# Patient Record
Sex: FEMALE | Race: BLACK OR AFRICAN AMERICAN | NOT HISPANIC OR LATINO | Employment: UNEMPLOYED | URBAN - METROPOLITAN AREA
[De-identification: names, ages, dates, MRNs, and addresses within clinical notes are randomized per-mention and may not be internally consistent; named-entity substitution may affect disease eponyms.]

---

## 2018-01-15 ENCOUNTER — HOSPITAL ENCOUNTER (EMERGENCY)
Facility: HOSPITAL | Age: 23
Discharge: HOME | End: 2018-01-15
Payer: COMMERCIAL

## 2018-01-15 VITALS
SYSTOLIC BLOOD PRESSURE: 105 MMHG | HEART RATE: 83 BPM | BODY MASS INDEX: 25.48 KG/M2 | HEIGHT: 64 IN | RESPIRATION RATE: 14 BRPM | WEIGHT: 149.25 LBS | DIASTOLIC BLOOD PRESSURE: 58 MMHG | TEMPERATURE: 98 F | OXYGEN SATURATION: 100 %

## 2018-01-15 DIAGNOSIS — R42 VERTIGO: Primary | ICD-10-CM

## 2018-01-15 DIAGNOSIS — R51.9 HEADACHE: ICD-10-CM

## 2018-01-15 DIAGNOSIS — R42 DIZZINESS: ICD-10-CM

## 2018-01-15 LAB
ALBUMIN SERPL BCP-MCNC: 3.5 G/DL
ALP SERPL-CCNC: 71 U/L
ALT SERPL W/O P-5'-P-CCNC: 11 U/L
AMPHET+METHAMPHET UR QL: NEGATIVE
ANION GAP SERPL CALC-SCNC: 9 MMOL/L
AST SERPL-CCNC: 15 U/L
B-HCG UR QL: NEGATIVE
BACTERIA #/AREA URNS HPF: NORMAL /HPF
BARBITURATES UR QL SCN>200 NG/ML: NEGATIVE
BASOPHILS # BLD AUTO: 0.04 K/UL
BASOPHILS NFR BLD: 0.4 %
BENZODIAZ UR QL SCN>200 NG/ML: NEGATIVE
BILIRUB SERPL-MCNC: 0.4 MG/DL
BILIRUB UR QL STRIP: NEGATIVE
BUN SERPL-MCNC: 10 MG/DL
BZE UR QL SCN: NEGATIVE
CALCIUM SERPL-MCNC: 9.2 MG/DL
CANNABINOIDS UR QL SCN: NEGATIVE
CHLORIDE SERPL-SCNC: 107 MMOL/L
CLARITY UR: CLEAR
CO2 SERPL-SCNC: 22 MMOL/L
COLOR UR: YELLOW
CREAT SERPL-MCNC: 0.7 MG/DL
CREAT UR-MCNC: 59.7 MG/DL
DIFFERENTIAL METHOD: ABNORMAL
EOSINOPHIL # BLD AUTO: 0.2 K/UL
EOSINOPHIL NFR BLD: 2.4 %
ERYTHROCYTE [DISTWIDTH] IN BLOOD BY AUTOMATED COUNT: 15 %
EST. GFR  (AFRICAN AMERICAN): >60 ML/MIN/1.73 M^2
EST. GFR  (NON AFRICAN AMERICAN): >60 ML/MIN/1.73 M^2
GLUCOSE SERPL-MCNC: 84 MG/DL
GLUCOSE UR QL STRIP: NEGATIVE
HCT VFR BLD AUTO: 41.6 %
HGB BLD-MCNC: 14 G/DL
HGB UR QL STRIP: NEGATIVE
KETONES UR QL STRIP: NEGATIVE
LEUKOCYTE ESTERASE UR QL STRIP: ABNORMAL
LYMPHOCYTES # BLD AUTO: 3.2 K/UL
LYMPHOCYTES NFR BLD: 32 %
MCH RBC QN AUTO: 28.8 PG
MCHC RBC AUTO-ENTMCNC: 33.7 G/DL
MCV RBC AUTO: 86 FL
METHADONE UR QL SCN>300 NG/ML: NEGATIVE
MICROSCOPIC COMMENT: NORMAL
MONOCYTES # BLD AUTO: 0.8 K/UL
MONOCYTES NFR BLD: 7.6 %
NEUTROPHILS # BLD AUTO: 5.8 K/UL
NEUTROPHILS NFR BLD: 57.6 %
NITRITE UR QL STRIP: NEGATIVE
OPIATES UR QL SCN: NEGATIVE
PCP UR QL SCN>25 NG/ML: NEGATIVE
PH UR STRIP: 6 [PH] (ref 5–8)
PLATELET # BLD AUTO: 287 K/UL
PMV BLD AUTO: 9.7 FL
POTASSIUM SERPL-SCNC: 4.3 MMOL/L
PROT SERPL-MCNC: 7.1 G/DL
PROT UR QL STRIP: NEGATIVE
RBC # BLD AUTO: 4.86 M/UL
SODIUM SERPL-SCNC: 138 MMOL/L
SP GR UR STRIP: 1.01 (ref 1–1.03)
SQUAMOUS #/AREA URNS HPF: 10 /HPF
TOXICOLOGY INFORMATION: NORMAL
TSH SERPL DL<=0.005 MIU/L-ACNC: 0.99 UIU/ML
URN SPEC COLLECT METH UR: ABNORMAL
UROBILINOGEN UR STRIP-ACNC: NEGATIVE EU/DL
WBC # BLD AUTO: 10.12 K/UL
WBC #/AREA URNS HPF: 1 /HPF (ref 0–5)

## 2018-01-15 PROCEDURE — 81025 URINE PREGNANCY TEST: CPT

## 2018-01-15 PROCEDURE — 99284 EMERGENCY DEPT VISIT MOD MDM: CPT

## 2018-01-15 PROCEDURE — 25000003 PHARM REV CODE 250: Performed by: PHYSICIAN ASSISTANT

## 2018-01-15 PROCEDURE — 80307 DRUG TEST PRSMV CHEM ANLYZR: CPT

## 2018-01-15 PROCEDURE — 81000 URINALYSIS NONAUTO W/SCOPE: CPT

## 2018-01-15 PROCEDURE — 84443 ASSAY THYROID STIM HORMONE: CPT

## 2018-01-15 PROCEDURE — 80053 COMPREHEN METABOLIC PANEL: CPT

## 2018-01-15 PROCEDURE — 85025 COMPLETE CBC W/AUTO DIFF WBC: CPT

## 2018-01-15 PROCEDURE — 36415 COLL VENOUS BLD VENIPUNCTURE: CPT

## 2018-01-15 RX ORDER — NAPROXEN 500 MG/1
500 TABLET ORAL 2 TIMES DAILY WITH MEALS
Qty: 12 TABLET | Refills: 0 | Status: SHIPPED | OUTPATIENT
Start: 2018-01-15 | End: 2023-10-27

## 2018-01-15 RX ORDER — NAPROXEN 500 MG/1
500 TABLET ORAL
Status: COMPLETED | OUTPATIENT
Start: 2018-01-15 | End: 2018-01-15

## 2018-01-15 RX ORDER — MECLIZINE HYDROCHLORIDE 25 MG/1
50 TABLET ORAL
Status: COMPLETED | OUTPATIENT
Start: 2018-01-15 | End: 2018-01-15

## 2018-01-15 RX ORDER — MECLIZINE HYDROCHLORIDE 25 MG/1
25 TABLET ORAL 3 TIMES DAILY PRN
Qty: 20 TABLET | Refills: 0 | Status: SHIPPED | OUTPATIENT
Start: 2018-01-15 | End: 2023-10-27

## 2018-01-15 RX ADMIN — NAPROXEN 500 MG: 500 TABLET ORAL at 12:01

## 2018-01-15 RX ADMIN — MECLIZINE HYDROCHLORIDE 50 MG: 25 TABLET ORAL at 10:01

## 2018-01-15 NOTE — ED PROVIDER NOTES
"   History      Chief Complaint   Patient presents with    Dizziness     c/o dizziness today with some confusion       Review of patient's allergies indicates:  No Known Allergies     HPI   HPI    1/15/2018, 10:20 AM   History obtained from the patient       History of Present Illness: Daya Dejesus is a 22 y.o. female patient who presents to the Emergency Department for room spinning dizziness with headache, and she says her mind feels "slow".   She is living in a sober living house now.  She says she has not used crack or mojo in a month.  She denies fever, n/v.  Symptoms are moderate in severity.     No further complaints or concerns at this time.           PCP: Primary Doctor No       Past Medical History:  History reviewed. No pertinent past medical history.      Past Surgical History:  Past Surgical History:   Procedure Laterality Date    TONSILLECTOMY             Family History:  History reviewed. No pertinent family history.        Social History:  Social History     Social History Main Topics    Smoking status: Current Every Day Smoker    Smokeless tobacco: Not on file    Alcohol use No    Drug use: Unknown      Comment: Mojo and Crack    Sexual activity: Yes       ROS     Review of Systems   Constitutional: Negative for chills and fever.   HENT: Negative for sore throat.    Respiratory: Negative for shortness of breath.    Cardiovascular: Negative for chest pain.   Gastrointestinal: Negative for nausea.   Genitourinary: Negative for dysuria.   Musculoskeletal: Negative for back pain.   Skin: Negative for rash.   Neurological: Positive for dizziness and headaches. Negative for seizures, syncope, facial asymmetry, speech difficulty and weakness.   Hematological: Does not bruise/bleed easily.   All other systems reviewed and are negative.      Physical Exam      Initial Vitals [01/15/18 1005]   BP Pulse Resp Temp SpO2   108/81 94 18 98.1 °F (36.7 °C) 100 %      MAP       90         Physical " "Exam  Vital signs and nursing notes reviewed.  Constitutional: Patient is in NAD. Awake and alert. Well-developed and well-nourished.  Head: Atraumatic. Normocephalic.  Eyes: PERRL. EOM intact. Conjunctivae nl. No scleral icterus.  ENT: Mucous membranes are moist. Oropharynx is clear.  Neck: Supple. No JVD. No lymphadenopathy.  No meningismus  Cardiovascular: Regular rate and rhythm. No murmurs, rubs, or gallops. Distal pulses are 2+ and symmetric.  Pulmonary/Chest: No respiratory distress. Clear to auscultation bilaterally. No wheezing, rales, or rhonchi.  Abdominal: Soft. Non-distended. No TTP. No rebound, guarding, or rigidity. Good bowel sounds.  Genitourinary: No CVA tenderness  Musculoskeletal: Moves all extremities. No edema.   Skin: Warm and dry.  Neurological: Awake and alert. No acute focal neurological deficits are appreciated. No facial droop.  Tongue is midline.  No pronator drift.  Finger to nose normal.  Hand  equal and strong, 5/5 motor strength x 4.    Psychiatric: Normal affect. Good eye contact. Appropriate in content.      ED Course          Procedures  ED Vital Signs:  Vitals:    01/15/18 1005 01/15/18 1149 01/15/18 1201 01/15/18 1246   BP: 108/81 (!) 109/58 107/60 (!) 105/58   Pulse: 94 83 77 83   Resp: 18 19 17 14   Temp: 98.1 °F (36.7 °C)      TempSrc: Oral      SpO2: 100% 100% 97% 100%   Weight: 67.7 kg (149 lb 4 oz)      Height: 5' 3.5" (1.613 m)            Results for orders placed or performed during the hospital encounter of 01/15/18   CBC auto differential   Result Value Ref Range    WBC 10.12 3.90 - 12.70 K/uL    RBC 4.86 4.00 - 5.40 M/uL    Hemoglobin 14.0 12.0 - 16.0 g/dL    Hematocrit 41.6 37.0 - 48.5 %    MCV 86 82 - 98 fL    MCH 28.8 27.0 - 31.0 pg    MCHC 33.7 32.0 - 36.0 g/dL    RDW 15.0 (H) 11.5 - 14.5 %    Platelets 287 150 - 350 K/uL    MPV 9.7 9.2 - 12.9 fL    Gran # 5.8 1.8 - 7.7 K/uL    Lymph # 3.2 1.0 - 4.8 K/uL    Mono # 0.8 0.3 - 1.0 K/uL    Eos # 0.2 0.0 - 0.5 " K/uL    Baso # 0.04 0.00 - 0.20 K/uL    Gran% 57.6 38.0 - 73.0 %    Lymph% 32.0 18.0 - 48.0 %    Mono% 7.6 4.0 - 15.0 %    Eosinophil% 2.4 0.0 - 8.0 %    Basophil% 0.4 0.0 - 1.9 %    Differential Method Automated    Pregnancy, urine rapid   Result Value Ref Range    Preg Test, Ur Negative    Urinalysis   Result Value Ref Range    Specimen UA Urine, Clean Catch     Color, UA Yellow Yellow, Straw, Kesha    Appearance, UA Clear Clear    pH, UA 6.0 5.0 - 8.0    Specific Gravity, UA 1.015 1.005 - 1.030    Protein, UA Negative Negative    Glucose, UA Negative Negative    Ketones, UA Negative Negative    Bilirubin (UA) Negative Negative    Occult Blood UA Negative Negative    Nitrite, UA Negative Negative    Urobilinogen, UA Negative <2.0 EU/dL    Leukocytes, UA Trace (A) Negative   Drug screen panel, emergency   Result Value Ref Range    Benzodiazepines Negative     Methadone metabolites Negative     Cocaine (Metab.) Negative     Opiate Scrn, Ur Negative     Barbiturate Screen, Ur Negative     Amphetamine Screen, Ur Negative     THC Negative     Phencyclidine Negative     Creatinine, Random Ur 59.7 15.0 - 325.0 mg/dL    Toxicology Information SEE COMMENT    Urinalysis Microscopic   Result Value Ref Range    WBC, UA 1 0 - 5 /hpf    Bacteria, UA Occasional None-Occ /hpf    Squam Epithel, UA 10 /hpf    Microscopic Comment SEE COMMENT    Comprehensive metabolic panel   Result Value Ref Range    Sodium 138 136 - 145 mmol/L    Potassium 4.3 3.5 - 5.1 mmol/L    Chloride 107 95 - 110 mmol/L    CO2 22 (L) 23 - 29 mmol/L    Glucose 84 70 - 110 mg/dL    BUN, Bld 10 6 - 20 mg/dL    Creatinine 0.7 0.5 - 1.4 mg/dL    Calcium 9.2 8.7 - 10.5 mg/dL    Total Protein 7.1 6.0 - 8.4 g/dL    Albumin 3.5 3.5 - 5.2 g/dL    Total Bilirubin 0.4 0.1 - 1.0 mg/dL    Alkaline Phosphatase 71 55 - 135 U/L    AST 15 10 - 40 U/L    ALT 11 10 - 44 U/L    Anion Gap 9 8 - 16 mmol/L    eGFR if African American >60 >60 mL/min/1.73 m^2    eGFR if non African  American >60 >60 mL/min/1.73 m^2   TSH   Result Value Ref Range    TSH 0.991 0.400 - 4.000 uIU/mL             Imaging Results:  Imaging Results          CT Head Without Contrast (Final result)  Result time 01/15/18 11:52:38    Final result by Merritt Caballero MD (01/15/18 11:52:38)                 Impression:       No acute abnormality identified.    All CT scans at this facility use dose modulation, iterative reconstruction and/or weight based dosing when appropriate to reduce radiation dose to as low as reasonably achievable.      Electronically signed by: MERRITT CABALLERO MD  Date:     01/15/18  Time:    11:52              Narrative:    Indication: Headache.    Axial CT images of the head were obtained without  contrast.    Comparison:  None    Findings:    Ventricles, sulci, and cisterns are symmetric without evidence of mass effect.  Brain volume and white matter are normal for age.  No intra or extraaxial masses, hemorrhages, abnormal fluid collections or abnormal calcifications. The cranium and extracranial structures are unremarkable.  Visualized sinuses and mastoid air cells are clear.                                 The Emergency Provider reviewed the vital signs and test results, which are outlined above.    ED Discussion             Medication(s) given in the ER:  Medications   meclizine tablet 50 mg (50 mg Oral Given 1/15/18 1046)   naproxen tablet 500 mg (500 mg Oral Given 1/15/18 1246)           Follow-up Information     [facility] in 2 days.    Specialty:  Internal Medicine  Contact information:  [address] [facility] [address] [phone]  Additional information:  (off [facility]) 1st floor                     Discharge Medication List as of 1/15/2018 12:56 PM      START taking these medications    Details   meclizine (ANTIVERT) 25 mg tablet Take 1 tablet (25 mg total) by mouth 3 (three) times daily as needed for Dizziness., Starting Mon 1/15/2018, Print       naproxen (NAPROSYN) 500 MG tablet Take 1 tablet (500 mg total) by mouth 2 (two) times daily with meals. Prn pain, Starting Mon 1/15/2018, Print                Medical Decision Making      Pt reports complete improvement with meclizine and naproxen.  Headache and dizziness gone.  All findings were reviewed with the patient/family in detail.   All remaining questions and concerns were addressed at that time.  Patient/family has been counseled regarding the need for follow-up as well as the indication to return to the emergency room should new or worrisome developments occur.        MDM               Clinical Impression:        ICD-10-CM ICD-9-CM   1. Vertigo R42 780.4   2. Headache R51 784.0   3. Dizziness R42 780.4             Satcy Jarrell PA-C  01/19/18 1133

## 2023-05-02 ENCOUNTER — TELEPHONE (OUTPATIENT)
Dept: OBSTETRICS AND GYNECOLOGY | Facility: CLINIC | Age: 28
End: 2023-05-02
Payer: MEDICAID

## 2023-06-02 ENCOUNTER — TELEPHONE (OUTPATIENT)
Dept: OBSTETRICS AND GYNECOLOGY | Facility: CLINIC | Age: 28
End: 2023-06-02
Payer: MEDICAID

## 2023-06-29 ENCOUNTER — TELEPHONE (OUTPATIENT)
Dept: OBSTETRICS AND GYNECOLOGY | Facility: CLINIC | Age: 28
End: 2023-06-29
Payer: MEDICAID

## 2023-10-26 DIAGNOSIS — Z36.89 ENCOUNTER FOR FETAL ANATOMIC SURVEY: Primary | ICD-10-CM

## 2023-10-27 ENCOUNTER — PROCEDURE VISIT (OUTPATIENT)
Dept: MATERNAL FETAL MEDICINE | Facility: CLINIC | Age: 28
End: 2023-10-27
Payer: MEDICAID

## 2023-10-27 ENCOUNTER — OFFICE VISIT (OUTPATIENT)
Dept: MATERNAL FETAL MEDICINE | Facility: CLINIC | Age: 28
End: 2023-10-27
Payer: MEDICAID

## 2023-10-27 VITALS
BODY MASS INDEX: 30.83 KG/M2 | HEIGHT: 63 IN | HEART RATE: 95 BPM | SYSTOLIC BLOOD PRESSURE: 103 MMHG | WEIGHT: 174 LBS | DIASTOLIC BLOOD PRESSURE: 71 MMHG

## 2023-10-27 DIAGNOSIS — O99.323 DRUG USE AFFECTING PREGNANCY IN THIRD TRIMESTER: ICD-10-CM

## 2023-10-27 DIAGNOSIS — O36.5931 LIGHT FOR GESTATIONAL DATES AFFECTING MANAGEMENT OF MOTHER, THIRD TRIMESTER, FETUS 1: ICD-10-CM

## 2023-10-27 DIAGNOSIS — O98.319 STD (SEXUALLY TRANSMITTED DISEASE) COMPLICATING PREGNANCY, ANTEPARTUM: ICD-10-CM

## 2023-10-27 DIAGNOSIS — Z36.89 ENCOUNTER FOR FETAL ANATOMIC SURVEY: ICD-10-CM

## 2023-10-27 DIAGNOSIS — Z87.59 HISTORY OF PRIOR PREGNANCY WITH SGA NEWBORN: ICD-10-CM

## 2023-10-27 DIAGNOSIS — O28.3 ABNORMAL PRENATAL ULTRASOUND: ICD-10-CM

## 2023-10-27 DIAGNOSIS — O09.33 LATE PRENATAL CARE AFFECTING PREGNANCY IN THIRD TRIMESTER: ICD-10-CM

## 2023-10-27 DIAGNOSIS — A64 STD (SEXUALLY TRANSMITTED DISEASE) COMPLICATING PREGNANCY, ANTEPARTUM: ICD-10-CM

## 2023-10-27 DIAGNOSIS — O09.213 CURRENT PREGNANCY WITH HISTORY OF PRE-TERM LABOR IN THIRD TRIMESTER: ICD-10-CM

## 2023-10-27 PROCEDURE — 3008F PR BODY MASS INDEX (BMI) DOCUMENTED: ICD-10-PCS | Mod: CPTII,S$GLB,, | Performed by: OBSTETRICS & GYNECOLOGY

## 2023-10-27 PROCEDURE — 99203 PR OFFICE/OUTPT VISIT, NEW, LEVL III, 30-44 MIN: ICD-10-PCS | Mod: TH,S$GLB,, | Performed by: OBSTETRICS & GYNECOLOGY

## 2023-10-27 PROCEDURE — 76820 UMBILICAL ARTERY ECHO: CPT | Mod: S$GLB,,, | Performed by: OBSTETRICS & GYNECOLOGY

## 2023-10-27 PROCEDURE — 76811 PR US, OB FETAL EVAL & EXAM, TRANSABDOM,FIRST GESTATION: ICD-10-PCS | Mod: S$GLB,,, | Performed by: OBSTETRICS & GYNECOLOGY

## 2023-10-27 PROCEDURE — 76819 FETAL BIOPHYS PROFIL W/O NST: CPT | Mod: S$GLB,,, | Performed by: OBSTETRICS & GYNECOLOGY

## 2023-10-27 PROCEDURE — 3078F PR MOST RECENT DIASTOLIC BLOOD PRESSURE < 80 MM HG: ICD-10-PCS | Mod: CPTII,S$GLB,, | Performed by: OBSTETRICS & GYNECOLOGY

## 2023-10-27 PROCEDURE — 3008F BODY MASS INDEX DOCD: CPT | Mod: CPTII,S$GLB,, | Performed by: OBSTETRICS & GYNECOLOGY

## 2023-10-27 PROCEDURE — 3074F SYST BP LT 130 MM HG: CPT | Mod: CPTII,S$GLB,, | Performed by: OBSTETRICS & GYNECOLOGY

## 2023-10-27 PROCEDURE — 99203 OFFICE O/P NEW LOW 30 MIN: CPT | Mod: TH,S$GLB,, | Performed by: OBSTETRICS & GYNECOLOGY

## 2023-10-27 PROCEDURE — 1159F MED LIST DOCD IN RCRD: CPT | Mod: CPTII,S$GLB,, | Performed by: OBSTETRICS & GYNECOLOGY

## 2023-10-27 PROCEDURE — 76820 PR US, OB DOPPLER, FETAL UMBILICAL ARTERY ECHO: ICD-10-PCS | Mod: S$GLB,,, | Performed by: OBSTETRICS & GYNECOLOGY

## 2023-10-27 PROCEDURE — 76819 PR US, OB, FETAL BIOPHYSICAL, W/O NST: ICD-10-PCS | Mod: S$GLB,,, | Performed by: OBSTETRICS & GYNECOLOGY

## 2023-10-27 PROCEDURE — 3078F DIAST BP <80 MM HG: CPT | Mod: CPTII,S$GLB,, | Performed by: OBSTETRICS & GYNECOLOGY

## 2023-10-27 PROCEDURE — 76811 OB US DETAILED SNGL FETUS: CPT | Mod: S$GLB,,, | Performed by: OBSTETRICS & GYNECOLOGY

## 2023-10-27 PROCEDURE — 1159F PR MEDICATION LIST DOCUMENTED IN MEDICAL RECORD: ICD-10-PCS | Mod: CPTII,S$GLB,, | Performed by: OBSTETRICS & GYNECOLOGY

## 2023-10-27 PROCEDURE — 3074F PR MOST RECENT SYSTOLIC BLOOD PRESSURE < 130 MM HG: ICD-10-PCS | Mod: CPTII,S$GLB,, | Performed by: OBSTETRICS & GYNECOLOGY

## 2023-10-27 RX ORDER — ASCORBIC ACID, CHOLECALCIFEROL, DL-.ALPHA.-TOCOPHEROL ACETATE, THIAMINE HYDROCHLORIDE, RIBOFLAVIN, NIACINAMIDE, PYRIDOXINE HYDROCHLORIDE, FOLIC ACID, CYANOCOBALAMIN, CALCIUM CARBONATE, FERROUS FUMARATE, ZINC OXIDE, CUPRIC SULFATE 100; 400; 30; 1.6; 1.6; 20; 3.1; 1; 12; 200; 27; 10; 2 MG/1; [IU]/1; [IU]/1; MG/1; MG/1; MG/1; MG/1; MG/1; UG/1; MG/1; MG/1; MG/1; MG/1
1 TABLET, COATED ORAL
COMMUNITY
Start: 2023-10-04

## 2023-10-27 NOTE — PROGRESS NOTES
"Maternal Fetal Medicine New Consult    Subjective     Patient ID: [id number]    Chief Complaint: MFM Consult w/us  (For suboptimal visulation of sacrum, PNC at 31 wks, h/o PTL, and drug use during pregnancy )      HPI 27 y.o.  female  at 34w1d gestation with Estimated Date of Delivery: 23. by  31w5d US and unknown LMP. She is sent for MFM consultation for suboptimal visualization of sacrum, PNC at 31 weeks, hx of PTL, drug use during pregnancy.  She had incomplete anatomyt scan with suboptimal visualization of fetal sacrum on outside ultrasound.  She had late prenatal care, with 1st OB visit at 31 weeks.  She was dated by the 31 week ultrasound.  She has history of  labor in her last pregnancy and delivered at 36 weeks.  She is history of drug use during pregnancy, cocaine and marijuana.  She reports she has not done any drugs since 2 months ago.  She currently smokes cigarettes.  She was diagnosed with chlamydia and trichomonas on 10/17/23.  She reports her partner was also tested and treated and they have been abstinent since.  She reports she has a few pills left.  Per prenatal record, both of her babies were small for gestational age with 1st baby 6 lb at "9 months" and 2nd baby 4 lb at 36 weeks.  She denies any personal or family history of aneuploidy or anomalies. She denies any exposure to high fevers, viral rashes, illicit drugs or alcohol in this pregnancy.  She denies any leaking fluid, vaginal bleeding, contractions, decreased fetal movement. Denies headaches, visual disturbances, or epigastric pain.    Pregnancy complications include:   Patient Active Problem List   Diagnosis    Abnormal prenatal ultrasound (suboptimal visualization of sacrum)    Late prenatal care affecting pregnancy in third trimester    Current pregnancy with history of pre-term labor in third trimester    Drug use affecting pregnancy in third trimester    History of prior pregnancy with SGA     STD (sexually " "transmitted disease) complicating pregnancy, antepartum    Late ultrasound assigned EDC with concern for possible fetal growth restriction affecting management of mother, third trimester, fetus 1        History reviewed. No pertinent past medical history.    Past Surgical History:   Procedure Laterality Date    TONSILLECTOMY         History reviewed. No pertinent family history.    Social History     Socioeconomic History    Marital status: Single   Tobacco Use    Smoking status: Every Day     Types: Cigarettes     Passive exposure: Current   Substance and Sexual Activity    Alcohol use: Not Currently    Drug use: Not Currently     Types: "Crack" cocaine     Comment: Mojo and Crack    Sexual activity: Yes     Partners: Male       Current Outpatient Medications   Medication Sig Dispense Refill     27 mg iron- 1 mg Tab Take 1 tablet by mouth.       No current facility-administered medications for this visit.       Review of patient's allergies indicates:  No Known Allergies    Medications:  Current Outpatient Medications   Medication Instructions     27 mg iron- 1 mg Tab 1 tablet, Oral       Review of Systems   12 point review of systems conducted, negative except as stated in the history of present illness. See HPI for details.      Objective     Visit Vitals  /71 (BP Location: Left arm, Patient Position: Sitting, BP Method: Medium (Automatic))   Pulse 95   Ht 5' 3" (1.6 m)   Wt 78.9 kg (174 lb)   LMP  (LMP Unknown)   BMI 30.82 kg/m²        Physical Exam  Vitals and nursing note reviewed.   Constitutional:       General: She is not in acute distress.     Appearance: Normal appearance.      Comments: Increased BMI   HENT:      Head: Normocephalic and atraumatic.      Nose: Nose normal. No congestion.      Mouth/Throat:      Pharynx: Oropharynx is clear.   Eyes:      General: No scleral icterus.     Pupils: Pupils are equal, round, and reactive to light.   Cardiovascular:      Rate and Rhythm: " Normal rate and regular rhythm.   Pulmonary:      Effort: No respiratory distress.      Breath sounds: Normal breath sounds. No wheezing.   Abdominal:      General: Abdomen is flat.      Palpations: Abdomen is soft.      Tenderness: There is no abdominal tenderness. There is no right CVA tenderness, left CVA tenderness or guarding.      Comments: No CVA tenderness gravid uterus.    Musculoskeletal:         General: Normal range of motion.      Cervical back: Neck supple.      Right lower leg: No edema.      Left lower leg: No edema.   Skin:     General: Skin is warm.      Findings: No bruising or rash.   Neurological:      General: No focal deficit present.      Mental Status: She is oriented to person, place, and time.      Deep Tendon Reflexes: Reflexes normal.      Comments: Normal reflexes   Psychiatric:         Mood and Affect: Mood normal.         Behavior: Behavior normal.         Thought Content: Thought content normal.         Judgment: Judgment normal.         ASSESSMENT/PLAN:     27 y.o.  female with IUP at 34w1d    Late prenatal care, antepartum  There is normal fetal growth with an EFW of 2276 g at the 23% and the AC at the 61% on 10/27/2023. No anomalies seen on fetal anatomy scan.  AFV is normal. BPP 8/8.    With unknown LMP or late PNC and uncertainty of GA, there is possibility for further gestational age with risk for FGR and higher risks for FDIU. Because of risks for FGR, an umbilical artery Doppler was done that was normal.    Discussed new ACOG recommendations for management of late dating pregnancy. With risk for further gestational age and growth abnormalities, would like to repeat ultrasound in 3.5 weeks to corroborate findings. Will resume  testing if suggestions of growth restrictions or new indications. FKC 3x/d. PTL precautions given.        History of  labor, currently pregnant  I discussed with her the increased risk of recurrence of another  delivery with  risk around 1/3. The risk ranges from 15% with one previous  delivery after 32 weeks that was followed by a term at birth to 60% with history of 2 consecutive  deliveries before 32 weeks. I have shared with her that the  delivery could occur at an earlier gestational age with more significant morbidity and high risk of  mortality associated with that.    Discussed withdrawal of 17P by FDA and new guidelines for management of previous  delivery.  However with advanced gestational age at this time, cervical surveillance is not indicated nor is progesterone supplementation.  Expectant management.   labor precautions discussed.      History of chlamydia and trichomonas in pregnancy  She reports almost done with treatment for both chlamydia and Trichomonas.  Advised to finish all antibiotics as prescribed.  She reports her partner was also tested and treated.  Reviewed the importance of absence until fully treated for any sexually transmitted infections in the future.  STI precautions given.      Drug use during pregnancy  I discussed with her the likely cognitive developmental effect of those drugs that would not be visible by the ultrasound and the importance of long term  follow up.  Marijuana use in pregnancy is associated with long term neurobehavioral adverse outcomes, as well as fetal growth restriction, stillbirth, spontaneous  birth, and  intensive care unit admission.  and I discussed the grave risk of cocaine use in pregnancy, including risk of life threatening abruption for both mother and fetus as well as risk of cerebrovascular accident in fetus, that may not be detected by prenatal ultrasound. I urged her to quit using drugs completely.    I stressed the importance of complete abstinence from drug use in pregnancy. I would recommend doing intermittent drug screens to help with compliance, with risks and benefits of drug testing discussed  and with ultrasounds to check fetal growth every 4 weeks (with continued drug use).        Smoking during pregnancy  Reviewed with her adverse  outcomes associated with smoking in pregnancy, including miscarriage, FGR, placenta previa,  delivery, PPROM, low birthweight, and placental abruption, with associated risks of  mortality. She was again urged to quit smoking.       Patient was counseled on the uncertainty about gestational age but the best EDC is going to be 2023.  To decrease the risk of undetected growth restriction a biophysical profile and umbilical artery Doppler were done and were reassuring.  Recommended fetal kick counts and will plan to rechecked growth in about 3 weeks from now.  Emphasized the importance of abstaining from drugs.  Labor instructions given.  Fetal kick count instructions.    Follow up for 3.5 weeks for , MFM follow-up, Repeat ultrasound, Room 1 or 2, to complete anatomy scan.     No future appointments.     Patient was evaluated by DIANA Daiz and Dr. Og.  Final assessment and recommendations as stated above were made by Dr. Og.    Components of this note were documented using voice recognition systems and are subject to errors not corrected at proofreading.  Please contact the author for any clarifications.